# Patient Record
Sex: FEMALE | Race: WHITE | NOT HISPANIC OR LATINO | Employment: FULL TIME | URBAN - METROPOLITAN AREA
[De-identification: names, ages, dates, MRNs, and addresses within clinical notes are randomized per-mention and may not be internally consistent; named-entity substitution may affect disease eponyms.]

---

## 2020-07-03 ENCOUNTER — OFFICE VISIT (OUTPATIENT)
Dept: URGENT CARE | Facility: CLINIC | Age: 50
End: 2020-07-03
Payer: COMMERCIAL

## 2020-07-03 VITALS
WEIGHT: 148 LBS | DIASTOLIC BLOOD PRESSURE: 94 MMHG | HEIGHT: 68 IN | BODY MASS INDEX: 22.43 KG/M2 | SYSTOLIC BLOOD PRESSURE: 122 MMHG | TEMPERATURE: 98 F | OXYGEN SATURATION: 99 % | HEART RATE: 73 BPM

## 2020-07-03 DIAGNOSIS — J06.9 UPPER RESPIRATORY TRACT INFECTION, UNSPECIFIED TYPE: Primary | ICD-10-CM

## 2020-07-03 PROCEDURE — 99203 PR OFFICE/OUTPT VISIT, NEW, LEVL III, 30-44 MIN: ICD-10-PCS | Mod: S$GLB,,, | Performed by: PHYSICIAN ASSISTANT

## 2020-07-03 PROCEDURE — 99203 OFFICE O/P NEW LOW 30 MIN: CPT | Mod: S$GLB,,, | Performed by: PHYSICIAN ASSISTANT

## 2020-07-03 PROCEDURE — U0003 INFECTIOUS AGENT DETECTION BY NUCLEIC ACID (DNA OR RNA); SEVERE ACUTE RESPIRATORY SYNDROME CORONAVIRUS 2 (SARS-COV-2) (CORONAVIRUS DISEASE [COVID-19]), AMPLIFIED PROBE TECHNIQUE, MAKING USE OF HIGH THROUGHPUT TECHNOLOGIES AS DESCRIBED BY CMS-2020-01-R: HCPCS

## 2020-07-03 NOTE — PATIENT INSTRUCTIONS
"Please Follow CDC Guidelines on "What to Do if Sick with COVID"  Self quarantine, wear mask in common areas of the home, disinfect common areas, stay home.  If must leave your home, then wear a mask at all times   Supportive care:  Increase fluids, rest, Tylenol for fever, deep breathing exercises to keep lungs open   Please follow-up with [facility] or your primary care physician for new/worsening symptoms       "

## 2020-07-03 NOTE — PROGRESS NOTES
"Subjective:       Patient ID: Billie Patel is a 50 y.o. female.    Vitals:  height is 5' 7.5" (1.715 m) and weight is 67.1 kg (148 lb). Her oral temperature is 98 °F (36.7 °C). Her blood pressure is 122/94 (abnormal) and her pulse is 73. Her oxygen saturation is 99%.     Chief Complaint: COVID-19 Concerns    Billie Patel is a 50-year-old female who presents to urgent care with a 3 day history of upper respiratory symptoms including nasal congestion, headaches, and sore throat.  She denies fever, cough, shortness of breath, loss of sense of taste or smell.  She has a college age boy a who has not tested positive himself, but she is concerned because she has been around him and is worried about his possible exposures.  She is requesting to be tested for COVID-19.    URI   This is a new problem. The current episode started in the past 7 days (4 days). The problem has been unchanged. There has been no fever. Associated symptoms include congestion, headaches, nausea, sinus pain and a sore throat. Pertinent negatives include no abdominal pain, chest pain, coughing, diarrhea, dysuria, ear pain, joint pain, joint swelling, neck pain, plugged ear sensation, rash, rhinorrhea, sneezing, swollen glands, vomiting or wheezing. She has tried NSAIDs and acetaminophen for the symptoms. The treatment provided mild relief.       Constitution: Positive for appetite change, sweating and fatigue. Negative for chills and fever.   HENT: Positive for congestion, sinus pain, sinus pressure and sore throat. Negative for ear pain and voice change.    Neck: Negative for neck pain and painful lymph nodes.   Cardiovascular: Negative for chest pain.   Eyes: Negative for eye redness.   Respiratory: Negative for chest tightness, cough, sputum production, bloody sputum, COPD, shortness of breath, stridor, wheezing and asthma.    Gastrointestinal: Positive for nausea. Negative for abdominal pain, vomiting and diarrhea.   Endocrine: negative. " "  Genitourinary: Negative for dysuria.   Musculoskeletal: Negative for muscle ache.   Skin: Negative for rash and erythema.   Allergic/Immunologic: Negative for seasonal allergies, asthma and sneezing.   Neurological: Positive for headaches.   Hematologic/Lymphatic: Negative for swollen lymph nodes.   Psychiatric/Behavioral: Negative.        Objective:      Physical Exam   Constitutional: She is oriented to person, place, and time. She appears well-developed.   HENT:   Head: Normocephalic and atraumatic. Head is without abrasion, without contusion and without laceration.   Right Ear: External ear normal.   Left Ear: External ear normal.   Nose: Nose normal.   Mouth/Throat: Oropharynx is clear and moist and mucous membranes are normal.   Eyes: Pupils are equal, round, and reactive to light. Conjunctivae, EOM and lids are normal.   Neck: Trachea normal, full passive range of motion without pain and phonation normal. Neck supple.   Cardiovascular: Normal rate, regular rhythm and normal heart sounds.   Pulmonary/Chest: Effort normal and breath sounds normal. No stridor. No respiratory distress.   Musculoskeletal: Normal range of motion.   Neurological: She is alert and oriented to person, place, and time.   Skin: Skin is warm, dry, intact and no rash. Capillary refill takes less than 2 seconds. abrasion, burn, bruising, erythema and ecchymosis  Psychiatric: Her speech is normal and behavior is normal. Judgment and thought content normal.   Nursing note and vitals reviewed.        Assessment:       1. Upper respiratory tract infection, unspecified type        Plan:         Upper respiratory tract infection, unspecified type         URI   -  test for COVID-19 at patient's request   -  discussed CDC guidelines    Please Follow CDC Guidelines on "What to Do if Sick with COVID"  Self quarantine, wear mask in common areas of the home, disinfect common areas, stay home.  If must leave your home, then wear a mask at all times "   Supportive care:  Increase fluids, rest, Tylenol for fever, deep breathing exercises to keep lungs open   Please follow-up with [facility] or your primary care physician for new/worsening symptoms     Karrie Owens PA-C   Physician Assistant   [facility]

## 2020-07-04 LAB — SARS-COV-2 RNA RESP QL NAA+PROBE: NOT DETECTED

## 2023-02-09 ENCOUNTER — OFFICE VISIT (OUTPATIENT)
Dept: PRIMARY CARE CLINIC | Facility: CLINIC | Age: 53
End: 2023-02-09
Payer: COMMERCIAL

## 2023-02-09 ENCOUNTER — TELEPHONE (OUTPATIENT)
Dept: PRIMARY CARE CLINIC | Facility: CLINIC | Age: 53
End: 2023-02-09
Payer: COMMERCIAL

## 2023-02-09 DIAGNOSIS — B02.9 HERPES ZOSTER WITHOUT COMPLICATION: Primary | ICD-10-CM

## 2023-02-09 PROCEDURE — 99213 PR OFFICE/OUTPT VISIT, EST, LEVL III, 20-29 MIN: ICD-10-PCS | Mod: 95,,, | Performed by: NURSE PRACTITIONER

## 2023-02-09 PROCEDURE — 99213 OFFICE O/P EST LOW 20 MIN: CPT | Mod: 95,,, | Performed by: NURSE PRACTITIONER

## 2023-02-09 PROCEDURE — 1159F PR MEDICATION LIST DOCUMENTED IN MEDICAL RECORD: ICD-10-PCS | Mod: CPTII,95,, | Performed by: NURSE PRACTITIONER

## 2023-02-09 PROCEDURE — 1160F PR REVIEW ALL MEDS BY PRESCRIBER/CLIN PHARMACIST DOCUMENTED: ICD-10-PCS | Mod: CPTII,95,, | Performed by: NURSE PRACTITIONER

## 2023-02-09 PROCEDURE — 1160F RVW MEDS BY RX/DR IN RCRD: CPT | Mod: CPTII,95,, | Performed by: NURSE PRACTITIONER

## 2023-02-09 PROCEDURE — 1159F MED LIST DOCD IN RCRD: CPT | Mod: CPTII,95,, | Performed by: NURSE PRACTITIONER

## 2023-02-09 RX ORDER — VALACYCLOVIR HYDROCHLORIDE 1 G/1
1000 TABLET, FILM COATED ORAL 3 TIMES DAILY
Qty: 21 TABLET | Refills: 0 | Status: SHIPPED | OUTPATIENT
Start: 2023-02-09 | End: 2023-02-16

## 2023-02-09 RX ORDER — GABAPENTIN 100 MG/1
100 CAPSULE ORAL 3 TIMES DAILY PRN
Qty: 30 CAPSULE | Refills: 0 | Status: SHIPPED | OUTPATIENT
Start: 2023-02-09 | End: 2024-02-09

## 2023-02-09 NOTE — TELEPHONE ENCOUNTER
Dr. Del Manzanares reached out and stated that patient is having shingles pain and would like to get in to be seen. Can be virtual or in person. Can see if they would like to do 340pm visit with me as virtual new patient or if not, then can see Rudolph Mcgrath NP today. Please contact patient. Thanks.

## 2023-02-09 NOTE — PROGRESS NOTES
Assessment & Plan  Problem List Items Addressed This Visit    None  Visit Diagnoses       Herpes zoster without complication    -  Primary  -We discussed effective administration of Valtrex and Gabapentin, adverse effects and side effects    Relevant Medications    valACYclovir (VALTREX) 1000 MG tablet, TID    gabapentin (NEURONTIN) 100 MG capsule, TID PRN              Health Maintenance reviewed: Deferred     The patient location is:  At School  The chief complaint leading to consultation is: noted below  Visit type: Virtual visit with synchronous audio and video  Total time spent with patient: 20 minutes   Each patient to whom he or she provides medical services by telemedicine is:  (1) informed of the relationship between the physician and patient and the respective role of any other health care provider with respect to management of the patient; and (2) notified that he or she may decline to receive medical services by telemedicine and may withdraw from such care at any time.    20+ minutes of total time spent on the encounter, which includes face to face time and non-face to face time preparing to see the patient (eg, review of tests), Obtaining and/or reviewing separately obtained history, documenting clinical information in the electronic or other health record, independently interpreting results (not separately reported) and communicating results to the patient/family/caregiver, or Care coordination (not separately reported).      Follow-up: RTC as needed         Chief Complaint  No chief complaint on file.      HPI  GRARETT Patel is a 52 y.o. female with multiple medical diagnoses as listed in the medical history and problem list that presents for Shingles via virtual visit.  This patient is new to me.     Shingles: Lesion erupted at right upper back approx a week ago. Two subsequent lesions erupted at anterior and posterior neck region. Lesions appear as fluid filled vesicles with a red base. She  reports a tingling-mild discomfort feeling at location of the lesions. Lesions do not cross the dermatome. Denies recent viral or bacterial infection. No known exposure to anyone with shingles. Works as a school educator.       PAST MEDICAL HISTORY:  Past Medical History:   Diagnosis Date    Migraines        PAST SURGICAL HISTORY:  Past Surgical History:   Procedure Laterality Date    TONSILLECTOMY         SOCIAL HISTORY:  Social History     Socioeconomic History    Marital status:    Tobacco Use    Smoking status: Never       FAMILY HISTORY:  Family History   Problem Relation Age of Onset    No Known Problems Mother     No Known Problems Father        ALLERGIES AND MEDICATIONS: updated and reviewed.  Review of patient's allergies indicates:  No Known Allergies  Current Outpatient Medications   Medication Sig Dispense Refill    gabapentin (NEURONTIN) 100 MG capsule Take 1 capsule (100 mg total) by mouth 3 (three) times daily as needed. 30 capsule 0    valACYclovir (VALTREX) 1000 MG tablet Take 1 tablet (1,000 mg total) by mouth 3 (three) times daily. for 7 days 21 tablet 0     No current facility-administered medications for this visit.         ROS  Review of Systems   Constitutional:  Positive for fatigue. Negative for fever.   HENT:  Negative for congestion, rhinorrhea and sore throat.    Eyes:  Negative for pain.   Respiratory:  Negative for cough and shortness of breath.    Gastrointestinal:  Negative for diarrhea and vomiting.   Skin:  Positive for rash.         Physical Exam  Physical Exam  Constitutional:       General: She is not in acute distress.     Appearance: Normal appearance.   HENT:      Head: Normocephalic and atraumatic.   Pulmonary:      Effort: Pulmonary effort is normal.   Lymphadenopathy:      Cervical: No cervical adenopathy.   Skin:     General: Skin is warm and dry.      Findings: Lesion and rash present. Rash is vesicular.   Neurological:      Mental Status: She is alert and oriented  to person, place, and time.         Health Maintenance         Date Due Completion Date    Hepatitis C Screening Never done ---    HIV Screening Never done ---    TETANUS VACCINE Never done ---    Colorectal Cancer Screening Never done ---    COVID-19 Vaccine (3 - Booster for Pfizer series) 06/13/2021 4/18/2021    Shingles Vaccine (1 of 2) Never done ---    Mammogram 08/06/2022 8/6/2021    Influenza Vaccine (1) 09/01/2022 12/2/2021    Cervical Cancer Screening 07/30/2024 7/30/2021    Lipid Panel 04/05/2027 4/5/2022            Answers submitted by the patient for this visit:  Rash Questionnaire (Submitted on 2/9/2023)  Chief Complaint: Rash  Chronicity: new  Onset: in the past 7 days  Progression since onset: gradually worsening  Characteristics: blistering, pain  Exposed to: nothing  anorexia: Yes  facial edema: No  joint pain: No  nail changes: No  Treatments tried: nothing  asthma: No  allergies: No  eczema: No  varicella: Yes

## 2023-02-13 NOTE — PROGRESS NOTES
Assessment & Plan  Problem List Items Addressed This Visit    None  Visit Diagnoses       Herpes zoster without complication    -  Primary  -We discussed effective administration of Valtrex and Gabapentin, adverse effects and side effects    Relevant Medications    valACYclovir (VALTREX) 1000 MG tablet, TID    gabapentin (NEURONTIN) 100 MG capsule, TID PRN              Health Maintenance reviewed: Deferred     The patient location is:  At School  The chief complaint leading to consultation is: noted below  Visit type: Virtual visit with synchronous audio and video  Total time spent with patient: 20 minutes   Each patient to whom he or she provides medical services by telemedicine is:  (1) informed of the relationship between the physician and patient and the respective role of any other health care provider with respect to management of the patient; and (2) notified that he or she may decline to receive medical services by telemedicine and may withdraw from such care at any time.    20+ minutes of total time spent on the encounter, which includes face to face time and non-face to face time preparing to see the patient (eg, review of tests), Obtaining and/or reviewing separately obtained history, documenting clinical information in the electronic or other health record, independently interpreting results (not separately reported) and communicating results to the patient/family/caregiver, or Care coordination (not separately reported).      Follow-up: RTC as needed         Chief Complaint  No chief complaint on file.      HPI  GARRETT Patel is a 52 y.o. female with multiple medical diagnoses as listed in the medical history and problem list that presents for Shingles via virtual visit.  This patient is new to me.     Shingles: Lesion erupted at right upper back approx a week ago. Two subsequent lesions erupted at anterior and posterior neck region. Lesions appear as fluid filled vesicles with a red base. She  reports a tingling-mild discomfort feeling at location of the lesions. Lesions do not cross the dermatome. Denies recent viral or bacterial infection. No known exposure to anyone with shingles. Works as a school educator.       PAST MEDICAL HISTORY:  Past Medical History:   Diagnosis Date    Migraines        PAST SURGICAL HISTORY:  Past Surgical History:   Procedure Laterality Date    TONSILLECTOMY         SOCIAL HISTORY:  Social History     Socioeconomic History    Marital status:    Tobacco Use    Smoking status: Never       FAMILY HISTORY:  Family History   Problem Relation Age of Onset    No Known Problems Mother     No Known Problems Father        ALLERGIES AND MEDICATIONS: updated and reviewed.  Review of patient's allergies indicates:  No Known Allergies  Current Outpatient Medications   Medication Sig Dispense Refill    gabapentin (NEURONTIN) 100 MG capsule Take 1 capsule (100 mg total) by mouth 3 (three) times daily as needed. 30 capsule 0    valACYclovir (VALTREX) 1000 MG tablet Take 1 tablet (1,000 mg total) by mouth 3 (three) times daily. for 7 days 21 tablet 0     No current facility-administered medications for this visit.         ROS  Review of Systems   Constitutional:  Positive for fatigue. Negative for fever.   HENT:  Negative for congestion, rhinorrhea and sore throat.    Eyes:  Negative for pain.   Respiratory:  Negative for cough and shortness of breath.    Gastrointestinal:  Negative for diarrhea and vomiting.   Skin:  Positive for rash.         Physical Exam  Physical Exam  Constitutional:       General: She is not in acute distress.     Appearance: Normal appearance.   HENT:      Head: Normocephalic and atraumatic.   Pulmonary:      Effort: Pulmonary effort is normal.   Lymphadenopathy:      Cervical: No cervical adenopathy.   Skin:     General: Skin is warm and dry.      Findings: Lesion and rash present. Rash is vesicular.   Neurological:      Mental Status: She is alert and oriented  to person, place, and time.         Health Maintenance         Date Due Completion Date    Hepatitis C Screening Never done ---    HIV Screening Never done ---    TETANUS VACCINE Never done ---    Colorectal Cancer Screening Never done ---    COVID-19 Vaccine (3 - Booster for Pfizer series) 06/13/2021 4/18/2021    Shingles Vaccine (1 of 2) Never done ---    Mammogram 08/06/2022 8/6/2021    Influenza Vaccine (1) 09/01/2022 12/2/2021    Cervical Cancer Screening 07/30/2024 7/30/2021    Lipid Panel 04/05/2027 4/5/2022            Answers submitted by the patient for this visit:  Rash Questionnaire (Submitted on 2/9/2023)  Chief Complaint: Rash  Chronicity: new  Onset: in the past 7 days  Progression since onset: gradually worsening  Characteristics: blistering, pain  Exposed to: nothing  anorexia: Yes  facial edema: No  joint pain: No  nail changes: No  Treatments tried: nothing  asthma: No  allergies: No  eczema: No  varicella: YesAnswers submitted by the patient for this visit:  Rash Questionnaire (Submitted on 2/9/2023)  Chief Complaint: Rash  Chronicity: new  Onset: in the past 7 days  Progression since onset: gradually worsening  Characteristics: blistering, pain  Exposed to: nothing  anorexia: Yes  congestion: Yes  cough: No  diarrhea: No  eye pain: No  facial edema: No  fatigue: Yes  fever: No  joint pain: No  nail changes: No  rhinorrhea: No  shortness of breath: No  sore throat: No  vomiting: No  Treatments tried: nothing  asthma: No  allergies: No  eczema: No  varicella: Yes

## 2024-11-04 ENCOUNTER — PATIENT MESSAGE (OUTPATIENT)
Dept: PRIMARY CARE CLINIC | Facility: CLINIC | Age: 54
End: 2024-11-04
Payer: COMMERCIAL

## 2024-12-05 ENCOUNTER — OFFICE VISIT (OUTPATIENT)
Dept: PRIMARY CARE CLINIC | Facility: CLINIC | Age: 54
End: 2024-12-05
Payer: COMMERCIAL

## 2024-12-05 VITALS
HEIGHT: 68 IN | HEART RATE: 103 BPM | WEIGHT: 148.69 LBS | RESPIRATION RATE: 18 BRPM | BODY MASS INDEX: 22.53 KG/M2 | DIASTOLIC BLOOD PRESSURE: 74 MMHG | SYSTOLIC BLOOD PRESSURE: 124 MMHG | TEMPERATURE: 97 F | OXYGEN SATURATION: 97 %

## 2024-12-05 DIAGNOSIS — Z79.890 HORMONE REPLACEMENT THERAPY (HRT): ICD-10-CM

## 2024-12-05 DIAGNOSIS — G43.009 MIGRAINE WITHOUT AURA AND WITHOUT STATUS MIGRAINOSUS, NOT INTRACTABLE: ICD-10-CM

## 2024-12-05 DIAGNOSIS — Z76.89 ENCOUNTER TO ESTABLISH CARE: ICD-10-CM

## 2024-12-05 DIAGNOSIS — Z00.00 ROUTINE GENERAL MEDICAL EXAMINATION AT A HEALTH CARE FACILITY: Primary | ICD-10-CM

## 2024-12-05 PROBLEM — N93.9 ABNORMAL UTERINE BLEEDING: Status: ACTIVE | Noted: 2022-08-02

## 2024-12-05 PROBLEM — D50.0 IRON DEFICIENCY ANEMIA DUE TO CHRONIC BLOOD LOSS: Status: ACTIVE | Noted: 2022-07-27

## 2024-12-05 PROCEDURE — 99396 PREV VISIT EST AGE 40-64: CPT | Mod: S$GLB,,, | Performed by: FAMILY MEDICINE

## 2024-12-05 PROCEDURE — 3074F SYST BP LT 130 MM HG: CPT | Mod: CPTII,S$GLB,, | Performed by: FAMILY MEDICINE

## 2024-12-05 PROCEDURE — 3078F DIAST BP <80 MM HG: CPT | Mod: CPTII,S$GLB,, | Performed by: FAMILY MEDICINE

## 2024-12-05 PROCEDURE — 1159F MED LIST DOCD IN RCRD: CPT | Mod: CPTII,S$GLB,, | Performed by: FAMILY MEDICINE

## 2024-12-05 PROCEDURE — 99999 PR PBB SHADOW E&M-EST. PATIENT-LVL III: CPT | Mod: PBBFAC,,, | Performed by: FAMILY MEDICINE

## 2024-12-05 PROCEDURE — 3008F BODY MASS INDEX DOCD: CPT | Mod: CPTII,S$GLB,, | Performed by: FAMILY MEDICINE

## 2024-12-05 PROCEDURE — 1160F RVW MEDS BY RX/DR IN RCRD: CPT | Mod: CPTII,S$GLB,, | Performed by: FAMILY MEDICINE

## 2024-12-05 RX ORDER — UBROGEPANT 50 MG/1
50 TABLET ORAL DAILY PRN
COMMUNITY

## 2024-12-05 RX ORDER — ESTRADIOL 0.5 MG/1
0.5 TABLET ORAL DAILY
COMMUNITY

## 2024-12-05 RX ORDER — PRASTERONE 6.5 MG/1
INSERT VAGINAL
COMMUNITY
Start: 2024-07-03

## 2024-12-05 NOTE — PROGRESS NOTES
Chief Complaint  Chief Complaint   Patient presents with    [facility]  GARRETT Patel is a 54 y.o. female with multiple medical diagnoses as listed in the medical history and problem list that presents for  in person visit.     History of Present Illness    CHIEF COMPLAINT:  - Patient presents for an initial visit to [facility] with a new PCP.    HPI:  Patient reports a history of migraine headaches, occurring 8-10 times per month. She uses Ubrelvy as needed for migraine relief, typically 5 doses per month. Ubrelvy is usually effective when taken early. Occasionally (twice this year), she has required two doses for severe migraines. During severe episodes, she has significant nausea, vomiting, and requires rest in a dark room with ice on her head.    Patient previously received Botox injections for migraine prevention from her neurologist, Dr. Rosales, for about 3 years. She discontinued this treatment approximately 2 years ago to assess for condition improvement. She continues to use Ubrelvy for acute migraine management.    Patient has a history of iron deficiency anemia, which required two iron infusions in the past. This condition has improved since her hysterectomy.    Regarding GYN health, the patient is currently on hormone therapy. She tried a combination of estrogen and testosterone over the summer but discontinued it due to rapid weight gain. She has since returned to using only estrogen. She also uses vaginal DHEA, though she reports inconsistent use due to application difficulties. Patient notes improved sleep with regular exercise.    Patient denies any new health concerns or symptoms.    MEDICATIONS:  - Ubrelvy, 8-10 tablets per month as needed for migraines, oral, effective if taken early  - Estrogen, daily, oral, for hormone replacement after hysterectomy  - Intrarosa (DHEA), vaginal, nightly (patient reports inconsistent use), for vaginal health  - Magnesium, oral, as needed  for constipation    PMH:  - Iron deficiency anemia: Prior to hysterectomy  - Migraines  - Shingles: Mild case in 2023  - Hysterectomy: Yes, about 4 months prior to starting estrogen  - Last Pap: Sees Dr. Bailey for gynecology  - HRT: Current estradiol and vaginal DHEA (Intrarosa). Prior combination estrogen-testosterone pill discontinued due to weight gain.    RECENT/REMOTE SURGICAL HISTORY:  - Hysterectomy: Approximately 4-5 years ago  - Colonoscopy: 2022, no polyps found, normal results    SOCIAL HISTORY:  - Occupation: Teacher at [employer], Lives in [place],  Headmaster at [employer]     [employer] MAINTENAINCE:  - Flu shot received most years at [facility]         Ohs Peq Sdoh    12/1/2024  2:29 PM CST - Filed by Patient   This questionnaire should take approximately 5 to 10 minutes to complete.  To begin, press Let's Begin and then press Continue. Let's Begin   On average, how many days per week do you engage in moderate to strenuous exercise (like a brisk walk)? 4 days   On average, how many minutes do you engage in exercise at this level? 40 min   Do you feel stress - tense, restless, nervous, or anxious, or unable to sleep at night because your mind is troubled all the time - these days? Only a little   How often do you feel lonely or isolated from those around you? Sometimes   How often do you need to have someone help you when you read instructions, pamphlets, or other written material from your doctor or pharmacy? Never   How hard is it for you to pay for the very basics like food, housing, medical care, and heating? Not hard at all   In the past 12 months has the electric, gas, oil, or water company threatened to shut off services in your home? No   Within the past 12 months, you worried that your food would run out before you got the money to buy more. Never true   Within the past 12 months, the food you bought just didn't last and you didn't have money to get more. Never true   Has the lack of  "transportation kept you from medical appointments, meetings, work or from getting things needed for daily living? No   In the last 12 months, was there a time when you were not able to pay the mortgage or rent on time? No   In the last 12 months, was there a time when you did not have a steady place to sleep or slept in a shelter (including now)? No   How often do you have a drink containing alcohol? 2-4 times a month   How many drinks containing alcohol do you have on a typical day when you are drinking? 1 or 2   How often do you have six or more drinks on one occasion? Never            Pmh, Psh, Family Hx, Social Hx, HM updated in Epic Tabs today.    Review of Systems   Constitutional:  Positive for unexpected weight change. Negative for activity change, appetite change and fatigue.   Respiratory:  Negative for cough and shortness of breath.    Cardiovascular:  Negative for chest pain and palpitations.   Gastrointestinal:  Negative for abdominal distention and abdominal pain.   Musculoskeletal:  Negative for arthralgias and gait problem.   Psychiatric/Behavioral:  Negative for dysphoric mood and sleep disturbance. The patient is not nervous/anxious.         Objective:     Vitals:    12/05/24 1417   BP: 124/74   BP Location: Left arm   Patient Position: Sitting   Pulse: 103   Resp: 18   Temp: 97.1 °F (36.2 °C)   TempSrc: Tympanic   SpO2: 97%   Weight: 67.4 kg (148 lb 11.2 oz)   Height: 5' 7.5" (1.715 m)     Wt Readings from Last 10 Encounters:   12/05/24 67.4 kg (148 lb 11.2 oz)   07/03/20 67.1 kg (148 lb)     Physical Exam    Vitals: Heart rate: 70 bpm. Blood pressure: 124/74. Weight: 148 lbs.  TEST RESULTS:  - Cholesterol panel: Total cholesterol 134, HDL 85, Triglycerides 52, LDL 37  - A1C: 5.5  - CBC: No signs of anemia, No signs of infection, Normal white count  - Thyroid levels: Normal  - Kidney and liver tests: Normal  - Fasting glucose: 90  - BUN: Slightly elevated  - Creatinine: Normal  - Liver enzymes: " Normal  - Alkaline phosphatase: Slightly low  - Albumin: Normal  - GFR: >60 (normal)  - Vitamin D: 48 (normal)  IMAGING:  - Mammogram: Recent       Physical Exam  Vitals reviewed.   Constitutional:       Appearance: Normal appearance. She is well-developed and normal weight.   HENT:      Head: Normocephalic and atraumatic.      Right Ear: Tympanic membrane and external ear normal.      Left Ear: Tympanic membrane and external ear normal.      Nose: Nose normal.      Mouth/Throat:      Mouth: Mucous membranes are moist.      Pharynx: Oropharynx is clear.   Eyes:      Conjunctiva/sclera: Conjunctivae normal.      Pupils: Pupils are equal, round, and reactive to light.   Neck:      Thyroid: No thyromegaly.   Cardiovascular:      Rate and Rhythm: Normal rate and regular rhythm.      Heart sounds: Normal heart sounds. No murmur heard.     No friction rub. No gallop.   Pulmonary:      Effort: Pulmonary effort is normal. No respiratory distress.      Breath sounds: Normal breath sounds. No wheezing or rales.   Abdominal:      General: Bowel sounds are normal. There is no distension.      Palpations: Abdomen is soft.      Tenderness: There is no abdominal tenderness. There is no rebound.   Musculoskeletal:         General: Normal range of motion.      Cervical back: Normal range of motion and neck supple.   Lymphadenopathy:      Cervical: No cervical adenopathy.   Skin:     General: Skin is warm and dry.      Findings: No rash.   Neurological:      Mental Status: She is alert and oriented to person, place, and time.   Psychiatric:         Attention and Perception: Attention and perception normal.         Mood and Affect: Mood and affect normal.         Speech: Speech normal.         Behavior: Behavior normal.         Thought Content: Thought content normal.         Cognition and Memory: Cognition and memory normal.         Judgment: Judgment normal.         Assessment:     1. Routine general medical examination at a [facility]  "care facility    2. Encounter to establish care    3. Hormone replacement therapy (HRT)    4. Migraine without aura and without status migrainosus, not intractable        LABS:   No results found for: "HGBA1C"   Lab Results   Component Value Date    CHOL 161 04/05/2022     Lab Results   Component Value Date    LDLCALC 66 04/05/2022     Lab Results   Component Value Date    CHOL 161 04/05/2022    TRIG 49 04/05/2022    HDL 85 04/05/2022    TSH 2.521 04/05/2022       Plan:   GARRETT Kan" was seen today for establish care.    Diagnoses and all orders for this visit:    Routine general medical examination at a health care facility    Encounter to establish care    Hormone replacement therapy (HRT)    Migraine without aura and without status migrainosus, not intractable        Assessment & Plan    IMPRESSION:  - Reviewed recent labs from Dr. Bailey, noting excellent cholesterol levels and normal thyroid, kidney, and liver function  - Assessed vitamin D level as adequate at 48  - Evaluated migraine management, noting current use of Ubrelvy as needed  - Discussed hormone therapy regimen, including estrogen and vaginal DHEA  - Determined patient is up-to-date on mammogram and colonoscopy screenings  - Assessed cardiovascular risk as low based on current lab values and health status    PLAN SUMMARY:  - Continue vaginal DHEA (Intrarosa)  - Continue estrogen therapy for hormone replacement post-hysterectomy  - Continue Ubrelvy as needed for migraines, up to 8-10 tablets per month  - Continue magnesium supplements for constipation and headache prevention  - Increase water intake and proper hydration  - Consider flavoring water or using electrolyte packets  - Flu vaccine offered  - Tetanus vaccine offered  - Shingles vaccine series offered  - Follow up on Friday afternoon for shingles vaccine if patient decides to proceed  - Follow up for nurse visit if patient decides to receive vaccines  - Consider joining [facility] or group exercise classes for weight training  - Follow up as needed for Ubrelvy refills    MIGRAINES:  - Evaluated the patient's migraine frequency, noting approximately 8-10 migraines per month, with 5 requiring medication.  - Assessed the efficacy of Ubrelvy, which the patient reports is effective if taken early.  - Noted that the patient occasionally experiences severe migraines with nausea, vomiting, and need for a dark room and ice.  - Reviewed past treatment history, including Topamax and other failed treatments.  - Continued Ubrelvy as needed for migraines, up to 8-10 tablets per month.  - Offered to refill Ubrelvy prescription if needed, in case the patient is unable to obtain from current prescriber.  - Discussed previous Botox injections in shoulders, neck, and head for migraine prevention, which were discontinued 2 years ago.  - Explained the relationship between magnesium supplementation and headache prevention.  - Instructed to follow up as needed for Ubrelvy refills.    POST-HYSTERECTOMY HORMONE REPLACEMENT:  - Noted the patient's history of hysterectomy.  - Continued hormone replacement therapy post-hysterectomy, including estrogen and vaginal DHEA.  - Continued estrogen therapy.  - Continued vaginal DHEA (Intrarosa).  - Noted the patient's modified application method for vaginal DHEA.    CONSTIPATION:  - Monitored the patient's ongoing constipation.  - Continued magnesium supplements to help with constipation.    WEIGHT MANAGEMENT:  - Emphasized the importance of muscle mass for metabolism during menopause.  - Discussed the benefits of weight training for maintaining muscle mass and metabolism.  - Patient to consider joining [facility] or other group exercise classes for accountability and muscle-building.    HYDRATION:  - Patient to focus on increasing water intake and proper hydra  tion.  - Patient to explore options for flavoring water or using electrolyte packets to  improve hydration.    SHINGLES VACCINATION:  - Educated on the shingles vaccine, including its effectiveness and potential side effects.  - Shingles vaccine series offered.  - Follow up on a Friday afternoon for shingles vaccine administration if patient decides to proceed.    TETANUS VACCINATION:  - Provided information on the tetanus vaccine and its recommended frequency.  - Tetanus vaccine offered.    FLU VACCINATION:  - Flu vaccine offered.    FOLLOW UP:  - Follow up for nurse visit if patient decides to receive vaccines.         No image results found.    The 10-year ASCVD risk score (Dede GOLD, et al., 2019) is: 0.7%    Values used to calculate the score:      Age: 54 years      Sex: Female      Is Non- : No      Diabetic: No      Tobacco smoker: No      Systolic Blood Pressure: 124 mmHg      Is BP treated: No      HDL Cholesterol: 85 mg/dL      Total Cholesterol: 161 mg/dL    Follow-up: Follow up in about 1 year (around 12/5/2025) for physical with Dr CAMPOS.    I spent a total of    40   minutes face to face and non-face to face on the date of this visit.This includes time preparing to see the patient (eg, review of tests, notes), obtaining and/or reviewing additional history from an independent historian and/or outside medical records, documenting clinical information in the electronic health record, independently interpreting results and/or communicating results to the patient/family/caregiver, or care coordinator.  Visit today included increased complexity associated with the care of the episodic problem addressed and managing the longitudinal care of the patient due to the serious and/or complex managed problem(s).    This note was generated with the assistance of ambient listening technology. Verbal consent was obtained by the patient and accompanying visitor(s) for the recording of patient appointment to facilitate this note. I attest to having reviewed and edited the generated  note for accuracy, though some syntax or spelling errors may persist. Please contact the author of this note for any clarification.       There are no Patient Instructions on file for this visit.

## 2024-12-09 PROBLEM — Z79.890 HORMONE REPLACEMENT THERAPY (HRT): Status: ACTIVE | Noted: 2024-12-09

## 2025-02-28 ENCOUNTER — PATIENT MESSAGE (OUTPATIENT)
Dept: PRIMARY CARE CLINIC | Facility: CLINIC | Age: 55
End: 2025-02-28
Payer: COMMERCIAL

## 2025-03-20 ENCOUNTER — OFFICE VISIT (OUTPATIENT)
Dept: PRIMARY CARE CLINIC | Facility: CLINIC | Age: 55
End: 2025-03-20
Payer: COMMERCIAL

## 2025-03-20 VITALS — HEIGHT: 68 IN | BODY MASS INDEX: 22.95 KG/M2

## 2025-03-20 DIAGNOSIS — G43.009 MIGRAINE WITHOUT AURA AND WITHOUT STATUS MIGRAINOSUS, NOT INTRACTABLE: Primary | ICD-10-CM

## 2025-03-20 DIAGNOSIS — Z79.890 HORMONE REPLACEMENT THERAPY (HRT): ICD-10-CM

## 2025-03-20 DIAGNOSIS — R51.9 INCREASED FREQUENCY OF HEADACHES: ICD-10-CM

## 2025-03-20 RX ORDER — UBROGEPANT 100 MG/1
100 TABLET ORAL
Qty: 8 TABLET | Refills: 12 | Status: SHIPPED | OUTPATIENT
Start: 2025-03-20

## 2025-03-20 RX ORDER — PIMECROLIMUS 10 MG/G
1 CREAM TOPICAL 2 TIMES DAILY
COMMUNITY
Start: 2025-03-04 | End: 2026-03-04

## 2025-03-20 NOTE — PROGRESS NOTES
Chief Complaint  Chief Complaint   Patient presents with    Follow-up     Increased migraine headaches         HPI  GARRETT Patel is a 54 y.o. female with multiple medical diagnoses as listed in the medical history and problem list that presents for  virtual visit.       History of Present Illness    CHIEF COMPLAINT:  - Patient, a 54-year-old female, presents for a video visit due to increased frequency of migraine headaches.    HPI:  Patient reports significantly more frequent migraines recently. She has been taking Ubrelvy 50mg as needed for migraine relief, but one tablet is no longer sufficient to manage her symptoms. Since January, she has had to take two tablets on two or three occasions, waiting the recommended two hours between doses. She typically uses her monthly prescription of eight tablets, causing anxiety about medication availability. She takes the medication for severe migraines and as a preventative measure for less intense headaches.    Patient underwent a hysterectomy approximately 2.5 to 3 years ago, with only one ovary removed. She has been on hormone replacement therapy, specifically 0.5mg of estrogen tablets, for about two years. She occasionally has brief episodes of anxiety resembling panic attacks rather than typical hot flashes. She sleeps well overall, with only occasional nighttime awakenings.    For past treatments, she previously received Botox injections for migraine prevention, estimated at 30-something injections. She discontinued this treatment about two years ago, around the time of her hysterectomy. Prior to Botox, she tried several other preventative medications, including Topamax, Cambia (a powder mixed with water), and Qdexi (50mg at night). She began Botox treatments on October 12, 2018.    Patient has been attempting to manage her migraines through dietary changes, including increasing protein intake and eliminating cottage cheese and soy products, which she believes  might be triggers.    She denies significant hot flashes, brain fog, and any major weight gain, noting only a slight increase of a few lbs.    MEDICATIONS:  - Ubrelvy 50 mg, as needed for migraines, oral, typically 8 tablets per month  - Estrogen 0.5 mg, daily, oral, for hormone replacement therapy after hysterectomy, duration: about 2 years  - Discontinued Botox injections for migraines about 2.5-3 years ago, around the time of hysterectomy  - Discontinued Qdexy 50 mg at night for migraine prevention, used in [id number]  - Discontinued Cambia for migraine treatment  - Discontinued Topamax for migraine prevention    PMH:  - Migraines: History of menstrual migraines  - Basal cell carcinoma: On neck  - Iron deficiency anemia  - Menopause: Yes  - Hysterectomy: Partial with left oophorectomy, 2.5-3 years ago    RECENT/REMOTE SURGICAL HISTORY:  - Hysterectomy: Approximately 2.5-3 years ago, one ovary removed         Pmh, Psh, Family Hx, Social Hx updated in Epic Tabs today.     Review of Systems   Constitutional:  Negative for activity change and unexpected weight change.   HENT:  Negative for hearing loss, rhinorrhea and trouble swallowing.    Eyes:  Negative for discharge and visual disturbance.   Respiratory:  Negative for chest tightness and wheezing.    Cardiovascular:  Negative for chest pain and palpitations.   Gastrointestinal:  Negative for blood in stool, constipation, diarrhea and vomiting.   Endocrine: Negative for polydipsia and polyuria.   Genitourinary:  Negative for difficulty urinating, dysuria, hematuria and menstrual problem.   Musculoskeletal:  Negative for arthralgias, joint swelling and neck pain.   Neurological:  Positive for headaches. Negative for weakness.   Psychiatric/Behavioral:  Negative for confusion and dysphoric mood.            Physical Exam  Vitals reviewed.   Constitutional:       General: She is awake. She is not in acute distress.     Appearance: Normal appearance. She is  well-developed, well-groomed and normal weight. She is not ill-appearing.   HENT:      Head: Normocephalic and atraumatic.      Right Ear: External ear normal.      Left Ear: External ear normal.      Nose: Nose normal.      Mouth/Throat:      Lips: Pink.   Eyes:      Conjunctiva/sclera: Conjunctivae normal.   Pulmonary:      Effort: Pulmonary effort is normal.   Neurological:      General: No focal deficit present.      Mental Status: She is alert and oriented to person, place, and time. Mental status is at baseline.   Psychiatric:         Attention and Perception: Attention and perception normal. She is attentive.         Mood and Affect: Mood and affect normal. Mood is not anxious or depressed. Affect is not labile, blunt, angry or inappropriate.         Speech: Speech normal. She is communicative. Speech is not rapid and pressured, delayed, slurred or tangential.         Behavior: Behavior normal. Behavior is not agitated, slowed, aggressive, withdrawn, hyperactive or combative. Behavior is cooperative.         Thought Content: Thought content normal. Thought content is not paranoid or delusional. Thought content does not include homicidal or suicidal ideation. Thought content does not include homicidal or suicidal plan.         Cognition and Memory: Cognition and memory normal. Memory is not impaired. She does not exhibit impaired recent memory or impaired remote memory.         Judgment: Judgment normal. Judgment is not impulsive or inappropriate.       Physical Exam                ASSESSMENT/PLAN:  1. Migraine without aura and without status migrainosus, not intractable  -     ubrogepant (UBRELVY) 100 mg tablet; Take 1 tablet (100 mg total) by mouth as needed for Migraine. If symptoms persist or return, may repeat dose after 2 hours. Maximum: 200 mg per 24 hours, increased frequency of migranies  Dispense: 8 tablet; Refill: 12    2. Hormone replacement therapy (HRT)    3. Increased frequency of  headaches      Assessment & Plan    G43.009 Migraine without aura and without status migrainosus, not intractable  Z79.890 Hormone replacement therapy (HRT)  R51.9 Increased frequency of headaches    IMPRESSION:  - Increased Ubrelvy from 50 mg to 100 mg tablets to address increased migraine frequency and intensity.  - Increased estrogen dose from 0.5 mg to 1 mg daily to potentially address hormone-related migraines. Patient to take two of current 0.5 mg tablets to achieve 1 mg dose.  - Considered potential triggers for increased migraines including hormonal changes, diet, and barometric pressure.  - Discussed possibility of restarting Botox treatments if current interventions are ineffective.    PLAN SUMMARY:  - Increase Ubrelvy from 50 mg to 100 mg tablets for migraines  - Increase estrogen dose from 0.5 mg to 1 mg daily  - Take Ubrelvy as needed, full tablet for significant migraines or half for mild ones  - Contact office for Ubrelvy or estrogen prescription refills if needed before next appointment  - Consider restarting Botox treatments if current interventions are ineffective  - Follow up in 1-2 months if migraine frequency does not improve with medication changes    MIGRAINE WITHOUT AURA AND WITHOUT STATUS MIGRAINOSUS, NOT INTRACTABLE:  - Increased Ubrelvy from 50 mg to 100 mg tablets to address increased migraine frequency and intensity.  - Take as needed for migraines, with the option to take a full tablet for significant migraines or break in half for mild ones.  - Patient to monitor migraine frequency and intensity after medication changes and keep a headache diary to track occurrences and potential triggers.  - Consider contacting neurologist to discuss restarting Botox treatments if current interventions are ineffective.  - Follow up in 1-2 months if migraine frequency does not improve with medication changes.    HORMONE REPLACEMENT THERAPY (HRT):  - Discussed various forms of hormone replacement  therapy, including oral tablets and patches.  - Increased estrogen dose from 0.5 mg to 1 mg daily to potentially address hormone-related migraines.  - Patient to take two of current 0.5 mg tablets to achieve 1 mg dose.  - Contact office if estrogen prescriptions need to be refilled before next appointment.    INCREASED FREQUENCY OF HEADACHES:  - Explained potential relationship between estrogen levels and migraine frequency.  - Contact office if Ubrelvy prescriptions need to be refilled before next appointment.    MIGRAINE:  - Informed patient about common migraine triggers, including certain foods, caffeine, salt, and seasonings.       No image results found.    Lab Results   Component Value Date    CHOL 161 04/05/2022    TRIG 49 04/05/2022    HDL 85 04/05/2022    TSH 2.521 04/05/2022       Follow-up: Follow up if symptoms worsen or fail to improve in 2 months for headaches/migraines.    ______________________________________________________________________    Face to Face time with patient: 11:58 AM CDT - 1219pm     The patient location is:  home  The chief complaint leading to consultation is: noted   Visit type: Virtual visit with synchronous audio and video   Each patient to whom he or she provides medical services by telemedicine is:  (1) informed of the relationship between the physician and patient and the respective role of any other health care provider with respect to management of the patient; and (2) notified that he or she may decline to receive medical services by telemedicine and may withdraw from such care at any time.    I spent a total of   30    minutes face to face and non-face to face on the date of this visit.This includes time preparing to see the patient (eg, review of tests, notes), obtaining and/or reviewing additional history from an independent historian and/or outside medical records, documenting clinical information in the electronic health record, independently interpreting results  and/or communicating results to the patient/family/caregiver, or care coordinator.  Visit today included increased complexity associated with the care of the episodic problem addressed and managing the longitudinal care of the patient due to the serious and/or complex managed problem(s).    This note was generated with the assistance of ambient listening technology. Verbal consent was obtained by the patient and accompanying visitor(s) for the recording of patient appointment to facilitate this note. I attest to having reviewed and edited the generated note for accuracy, though some syntax or spelling errors may persist. Please contact the author of this note for any clarification.